# Patient Record
Sex: FEMALE
[De-identification: names, ages, dates, MRNs, and addresses within clinical notes are randomized per-mention and may not be internally consistent; named-entity substitution may affect disease eponyms.]

---

## 2017-05-10 ENCOUNTER — APPOINTMENT (OUTPATIENT)
Dept: PSYCHIATRY | Facility: CLINIC | Age: 52
End: 2017-05-10

## 2017-06-21 ENCOUNTER — APPOINTMENT (OUTPATIENT)
Dept: PSYCHIATRY | Facility: CLINIC | Age: 52
End: 2017-06-21

## 2017-11-07 ENCOUNTER — APPOINTMENT (OUTPATIENT)
Dept: PSYCHIATRY | Facility: CLINIC | Age: 52
End: 2017-11-07
Payer: COMMERCIAL

## 2017-11-07 PROCEDURE — 99214 OFFICE O/P EST MOD 30 MIN: CPT

## 2018-12-18 ENCOUNTER — APPOINTMENT (OUTPATIENT)
Dept: PSYCHIATRY | Facility: CLINIC | Age: 53
End: 2018-12-18
Payer: COMMERCIAL

## 2018-12-18 PROCEDURE — 99214 OFFICE O/P EST MOD 30 MIN: CPT

## 2019-02-01 ENCOUNTER — RX RENEWAL (OUTPATIENT)
Age: 54
End: 2019-02-01

## 2019-04-30 ENCOUNTER — APPOINTMENT (OUTPATIENT)
Dept: PSYCHIATRY | Facility: CLINIC | Age: 54
End: 2019-04-30

## 2019-06-03 ENCOUNTER — RX RENEWAL (OUTPATIENT)
Age: 54
End: 2019-06-03

## 2019-06-11 ENCOUNTER — APPOINTMENT (OUTPATIENT)
Dept: PSYCHIATRY | Facility: CLINIC | Age: 54
End: 2019-06-11
Payer: COMMERCIAL

## 2019-06-11 DIAGNOSIS — Z86.39 PERSONAL HISTORY OF OTHER ENDOCRINE, NUTRITIONAL AND METABOLIC DISEASE: ICD-10-CM

## 2019-06-11 DIAGNOSIS — E03.9 HYPOTHYROIDISM, UNSPECIFIED: ICD-10-CM

## 2019-06-11 PROCEDURE — 99214 OFFICE O/P EST MOD 30 MIN: CPT

## 2019-06-11 RX ORDER — CHROMIUM 200 MCG
1000 TABLET ORAL
Refills: 0 | Status: ACTIVE | COMMUNITY
Start: 2019-06-11

## 2019-06-11 RX ORDER — MULTIVIT WITH CALCIUM,IRON,MIN
TABLET ORAL DAILY
Qty: 90 | Refills: 0 | Status: ACTIVE | COMMUNITY
Start: 2019-06-11

## 2019-06-11 RX ORDER — THYROID, PORCINE 15 MG/1
15 TABLET ORAL DAILY
Refills: 0 | Status: ACTIVE | COMMUNITY
Start: 2019-06-11

## 2019-06-11 RX ORDER — OMEGA-3-ACID ETHYL ESTERS CAPSULES 1 G/1
1 CAPSULE, LIQUID FILLED ORAL
Refills: 0 | Status: ACTIVE | COMMUNITY
Start: 2019-06-11

## 2019-09-24 ENCOUNTER — APPOINTMENT (OUTPATIENT)
Dept: PSYCHIATRY | Facility: CLINIC | Age: 54
End: 2019-09-24

## 2019-09-26 ENCOUNTER — RX RENEWAL (OUTPATIENT)
Age: 54
End: 2019-09-26

## 2019-11-19 ENCOUNTER — APPOINTMENT (OUTPATIENT)
Dept: PSYCHIATRY | Facility: CLINIC | Age: 54
End: 2019-11-19
Payer: COMMERCIAL

## 2019-11-19 PROCEDURE — 99214 OFFICE O/P EST MOD 30 MIN: CPT

## 2020-04-21 ENCOUNTER — APPOINTMENT (OUTPATIENT)
Dept: PSYCHIATRY | Facility: CLINIC | Age: 55
End: 2020-04-21
Payer: COMMERCIAL

## 2020-04-21 PROCEDURE — 99213 OFFICE O/P EST LOW 20 MIN: CPT | Mod: 95

## 2021-02-01 ENCOUNTER — RX RENEWAL (OUTPATIENT)
Age: 56
End: 2021-02-01

## 2021-05-20 ENCOUNTER — APPOINTMENT (OUTPATIENT)
Dept: PSYCHIATRY | Facility: CLINIC | Age: 56
End: 2021-05-20
Payer: COMMERCIAL

## 2021-05-20 PROCEDURE — 99214 OFFICE O/P EST MOD 30 MIN: CPT | Mod: 95

## 2021-09-07 ENCOUNTER — RX RENEWAL (OUTPATIENT)
Age: 56
End: 2021-09-07

## 2021-11-29 ENCOUNTER — RX RENEWAL (OUTPATIENT)
Age: 56
End: 2021-11-29

## 2021-12-06 ENCOUNTER — APPOINTMENT (OUTPATIENT)
Dept: PSYCHIATRY | Facility: CLINIC | Age: 56
End: 2021-12-06
Payer: COMMERCIAL

## 2021-12-06 PROCEDURE — 99214 OFFICE O/P EST MOD 30 MIN: CPT | Mod: 95

## 2021-12-06 RX ORDER — DULOXETINE HYDROCHLORIDE 40 MG/1
40 CAPSULE, DELAYED RELEASE PELLETS ORAL DAILY
Qty: 30 | Refills: 0 | Status: DISCONTINUED | COMMUNITY
Start: 2019-01-29 | End: 2021-12-06

## 2022-01-10 RX ORDER — DULOXETINE HYDROCHLORIDE 30 MG/1
30 CAPSULE, DELAYED RELEASE PELLETS ORAL
Qty: 15 | Refills: 0 | Status: DISCONTINUED | COMMUNITY
Start: 2021-09-07 | End: 2022-01-10

## 2022-02-08 ENCOUNTER — APPOINTMENT (OUTPATIENT)
Dept: PSYCHIATRY | Facility: CLINIC | Age: 57
End: 2022-02-08
Payer: COMMERCIAL

## 2022-02-14 ENCOUNTER — RX RENEWAL (OUTPATIENT)
Age: 57
End: 2022-02-14

## 2022-03-22 ENCOUNTER — APPOINTMENT (OUTPATIENT)
Dept: PSYCHIATRY | Facility: CLINIC | Age: 57
End: 2022-03-22
Payer: COMMERCIAL

## 2022-03-22 DIAGNOSIS — F34.1 DYSTHYMIC DISORDER: ICD-10-CM

## 2022-03-22 PROCEDURE — 99213 OFFICE O/P EST LOW 20 MIN: CPT | Mod: 95

## 2022-10-27 ENCOUNTER — APPOINTMENT (OUTPATIENT)
Dept: PSYCHIATRY | Facility: CLINIC | Age: 57
End: 2022-10-27

## 2022-10-27 PROCEDURE — 99214 OFFICE O/P EST MOD 30 MIN: CPT

## 2022-10-27 RX ORDER — FLUOXETINE HYDROCHLORIDE 20 MG/1
20 CAPSULE ORAL DAILY
Qty: 30 | Refills: 0 | Status: DISCONTINUED | COMMUNITY
Start: 2022-01-10 | End: 2022-10-27

## 2022-10-27 NOTE — PHYSICAL EXAM
[None] : none [Well groomed] : well groomed [Average] : average [WNL] : within normal limits [Cooperative] : cooperative [FreeTextEntry1] : Thin appearing.  [de-identified] : restricted.

## 2022-10-27 NOTE — DISCUSSION/SUMMARY
[FreeTextEntry1] : Pt to send the undersigned her consult, which she had with heme onc/ for autoimmune w/u which pt states was negative. \par \par Pt with chronic depression, pt reports poor tolerance of higher doses of SSRI. \par Encouraged to change family of med, to SNRI.

## 2022-10-27 NOTE — HISTORY OF PRESENT ILLNESS
[FreeTextEntry1] : Pt seen and evaluated today, states she had gone up to 40 mg of Prozac but felt more anxious. \par Pt report since starting her job , she may have been more anxious, she is glad to be working, but still finds life "blah". \par The office is isolative, post COVID, and she has not been exercising due to having frozen shoulder and finds her mood down "in the dumps", generally she finds herself irritable. \par Pt states she is worse on the w/e. Pt had gotten her son tested by neuropsychology- takes 4-6 hours to do homework. \par Pt has a new dog with a stomach ailment and has been incontinent. Pt reports lack of mood reactivity, she has tickets to a [employer] play and plans to see her sister in [place]. \par

## 2023-01-12 ENCOUNTER — NON-APPOINTMENT (OUTPATIENT)
Age: 58
End: 2023-01-12

## 2023-01-12 RX ORDER — DESVENLAFAXINE 25 MG/1
25 TABLET, EXTENDED RELEASE ORAL
Qty: 90 | Refills: 3 | Status: DISCONTINUED | COMMUNITY
Start: 2022-10-27 | End: 2023-01-12

## 2023-01-12 NOTE — DISCUSSION/SUMMARY
[FreeTextEntry1] : Pt reports that she is diagnosed with skin cancer and is "freaking out", pt reports hot flashes on Pristiq and is not willing to increase the dose to 50 mg. Pt wants to return to Duloxetine, pt has 20 mg tablets at home, and also 30 mg tablets, was advised to start with 20 mg and then titrate to 30 mg and monitor as well as come in for a scheduled appointment, pt reports she is catastrophizing. \par Plan to give limited amount of Alprazolam, pt to speak with surgeon about use of med prior to Mohs procedure.

## 2023-03-06 ENCOUNTER — APPOINTMENT (OUTPATIENT)
Dept: PSYCHIATRY | Facility: CLINIC | Age: 58
End: 2023-03-06

## 2023-03-19 ENCOUNTER — RX RENEWAL (OUTPATIENT)
Age: 58
End: 2023-03-19

## 2023-03-20 ENCOUNTER — APPOINTMENT (OUTPATIENT)
Dept: PSYCHIATRY | Facility: CLINIC | Age: 58
End: 2023-03-20
Payer: COMMERCIAL

## 2023-03-20 PROCEDURE — 99214 OFFICE O/P EST MOD 30 MIN: CPT | Mod: 95

## 2023-03-20 NOTE — HISTORY OF PRESENT ILLNESS
[FreeTextEntry1] : Please see Zung rating scale in October of 2022. Per pt the target sx of irritability and feeling down hearted have improved. \par Pt reports that she has skin cancer - has surgery pending on her nose. She has scans pending. \par Pt has basal cell cancers removed. \par Pt now less nervous. \par Pt did take Alprazolam prn for the first surgery, which had helped, and anxiety is less with this pending surgery. \par No change in neurovegetative sx, no tyson, no psychosis.

## 2023-03-20 NOTE — PLAN
[Yes. details: ___] : Yes, [unfilled] [FreeTextEntry4] : Pt is meeting her goals. \par Pt tolerating medications.  [FreeTextEntry5] : Pt reports she is clinically stable, generally seen twice or three times per year.

## 2023-03-20 NOTE — REVIEW OF SYSTEMS
[Negative] : Allergic/Immunologic [FreeTextEntry9] : pt plays pickle ball, pulled her groin.  [de-identified] : see interval history.

## 2023-03-20 NOTE — PHYSICAL EXAM
[Well groomed] : well groomed [Cooperative] : cooperative [Euthymic] : euthymic [Full] : full [Clear] : clear [Linear/Goal Directed] : linear/goal directed [None] : none [None Reported] : none reported [Average] : average [WNL] : within normal limits Statement Selected

## 2023-03-20 NOTE — REASON FOR VISIT
[Continuing, patient seen in-person within last 12 months] : Telehealth services are continuing as patient has been seen in-person within last 12 months. [Telehealth (audio & video) - Individual/Group] : This visit was provided via telehealth using real-time 2-way audio visual technology. [Medical Office: ([facility])___] : The provider was located at the medical office in [unfilled]. [Home] : The patient, [unfilled], was located at home, [unfilled], at the time of the visit. [Verbal consent obtained from patient/other participant(s)] : Verbal consent for telehealth/telephonic services obtained from patient/other participant(s) [Patient] : Patient [FreeTextEntry4] : 4:30 pm  [FreeTextEntry2] : 10/27/2022 [FreeTextEntry1] : Pt is a follow up visit.

## 2023-03-20 NOTE — RISK ASSESSMENT
[No, patient denies ideation or behavior] : No, patient denies ideation or behavior [Low acute suicide risk] : Low acute suicide risk [Yes] : Yes

## 2023-10-04 ENCOUNTER — APPOINTMENT (OUTPATIENT)
Dept: PSYCHIATRY | Facility: CLINIC | Age: 58
End: 2023-10-04
Payer: COMMERCIAL

## 2023-10-04 PROCEDURE — 99213 OFFICE O/P EST LOW 20 MIN: CPT | Mod: 95

## 2023-10-04 RX ORDER — DULOXETINE HYDROCHLORIDE 20 MG/1
20 CAPSULE, DELAYED RELEASE PELLETS ORAL DAILY
Qty: 7 | Refills: 0 | Status: DISCONTINUED | COMMUNITY
Start: 2023-01-12 | End: 2023-10-04

## 2024-01-18 ENCOUNTER — RX ONLY (RX ONLY)
Age: 59
End: 2024-01-18

## 2024-01-18 ENCOUNTER — OFFICE (OUTPATIENT)
Dept: URBAN - METROPOLITAN AREA CLINIC 109 | Facility: CLINIC | Age: 59
Setting detail: OPHTHALMOLOGY
End: 2024-01-18
Payer: COMMERCIAL

## 2024-01-18 DIAGNOSIS — H16.223: ICD-10-CM

## 2024-01-18 DIAGNOSIS — H02.834: ICD-10-CM

## 2024-01-18 DIAGNOSIS — H02.015: ICD-10-CM

## 2024-01-18 DIAGNOSIS — H02.835: ICD-10-CM

## 2024-01-18 DIAGNOSIS — H00.025: ICD-10-CM

## 2024-01-18 DIAGNOSIS — H02.832: ICD-10-CM

## 2024-01-18 DIAGNOSIS — H02.831: ICD-10-CM

## 2024-01-18 PROCEDURE — 99203 OFFICE O/P NEW LOW 30 MIN: CPT | Performed by: OPHTHALMOLOGY

## 2024-01-18 ASSESSMENT — REFRACTION_CURRENTRX
OS_SPHERE: -2.50
OD_SPHERE: -2.25
OS_ADD: +2.25
OD_CYLINDER: +0.25
OD_OVR_VA: 20/
OS_VPRISM_DIRECTION: PROGS
OD_AXIS: 45
OS_OVR_VA: 20/
OS_CYLINDER: +0.50
OD_VPRISM_DIRECTION: PROGS
OS_AXIS: 142
OD_ADD: +2.25

## 2024-01-18 ASSESSMENT — REFRACTION_AUTOREFRACTION
OS_SPHERE: -0.75
OD_AXIS: 122
OS_AXIS: 069
OS_CYLINDER: -0.50
OD_SPHERE: -1.00
OD_CYLINDER: -0.50

## 2024-01-18 ASSESSMENT — LID EXAM ASSESSMENTS
OS_TRICHIASIS: LLL T
OS_DERMATOCHALASIS: 1+

## 2024-01-18 ASSESSMENT — CONFRONTATIONAL VISUAL FIELD TEST (CVF)
OS_FINDINGS: FULL
OD_FINDINGS: FULL

## 2024-01-18 ASSESSMENT — SPHEQUIV_DERIVED
OD_SPHEQUIV: -1.25
OS_SPHEQUIV: -1

## 2024-02-15 ENCOUNTER — APPOINTMENT (OUTPATIENT)
Dept: GASTROENTEROLOGY | Facility: CLINIC | Age: 59
End: 2024-02-15
Payer: COMMERCIAL

## 2024-02-15 VITALS
OXYGEN SATURATION: 99 % | WEIGHT: 110 LBS | TEMPERATURE: 96.8 F | HEIGHT: 64 IN | BODY MASS INDEX: 18.78 KG/M2 | SYSTOLIC BLOOD PRESSURE: 108 MMHG | HEART RATE: 71 BPM | DIASTOLIC BLOOD PRESSURE: 66 MMHG

## 2024-02-15 DIAGNOSIS — K59.00 CONSTIPATION, UNSPECIFIED: ICD-10-CM

## 2024-02-15 PROCEDURE — 99203 OFFICE O/P NEW LOW 30 MIN: CPT

## 2024-02-15 RX ORDER — POLYETHYLENE GLYCOL-3350 AND ELECTROLYTES 236; 6.74; 5.86; 2.97; 22.74 G/274.31G; G/274.31G; G/274.31G; G/274.31G; G/274.31G
236 POWDER, FOR SOLUTION ORAL
Qty: 1 | Refills: 0 | Status: ACTIVE | COMMUNITY
Start: 2024-02-15 | End: 1900-01-01

## 2024-02-15 NOTE — HISTORY OF PRESENT ILLNESS
[FreeTextEntry1] : Patient came to the office today to arrange for colonoscopy.  It has been more than 10 years since her last examination.  The visit was prompted by patient's recent complaint of constipation where she is passing hard pellet-like stools.  She had some small blood while straining.  She denies associated nausea vomiting fever chills melena diarrhea weight loss or other systemic complaints.

## 2024-02-15 NOTE — PHYSICAL EXAM

## 2024-02-15 NOTE — ASSESSMENT
[FreeTextEntry1] : Impression and plan Patient came to the office today to discuss colonoscopy.  It has been more than 10 years since her last exam.  She is chronically constipated and we had a discussion regarding management.  The patient will increase fiber and fluid in her diet she will use over-the-counter laxatives i.e. MiraLAX if necessary.  In the interim patient will continue to observe for any new bleeding and contact me as necessary.  The risks benefits alternatives and limitation of colonoscopy were discussed.

## 2024-04-23 ENCOUNTER — APPOINTMENT (OUTPATIENT)
Dept: GASTROENTEROLOGY | Facility: AMBULATORY MEDICAL SERVICES | Age: 59
End: 2024-04-23
Payer: COMMERCIAL

## 2024-04-23 PROCEDURE — 45378 DIAGNOSTIC COLONOSCOPY: CPT

## 2024-06-21 ENCOUNTER — APPOINTMENT (OUTPATIENT)
Dept: PSYCHIATRY | Facility: CLINIC | Age: 59
End: 2024-06-21
Payer: COMMERCIAL

## 2024-06-21 ENCOUNTER — APPOINTMENT (OUTPATIENT)
Dept: PSYCHIATRY | Facility: CLINIC | Age: 59
End: 2024-06-21

## 2024-06-21 DIAGNOSIS — F34.1 DYSTHYMIC DISORDER: ICD-10-CM

## 2024-06-21 DIAGNOSIS — F41.1 GENERALIZED ANXIETY DISORDER: ICD-10-CM

## 2024-06-21 PROCEDURE — 99214 OFFICE O/P EST MOD 30 MIN: CPT

## 2024-06-21 RX ORDER — ALPRAZOLAM 0.25 MG/1
0.25 TABLET ORAL DAILY
Qty: 5 | Refills: 0 | Status: ACTIVE | COMMUNITY
Start: 2023-01-12 | End: 1900-01-01

## 2024-06-21 RX ORDER — VORTIOXETINE 10 MG/1
10 TABLET, FILM COATED ORAL DAILY
Qty: 30 | Refills: 1 | Status: ACTIVE | COMMUNITY
Start: 2024-06-21 | End: 1900-01-01

## 2024-06-21 RX ORDER — DULOXETINE HYDROCHLORIDE 30 MG/1
30 CAPSULE, DELAYED RELEASE PELLETS ORAL
Qty: 90 | Refills: 3 | Status: DISCONTINUED | COMMUNITY
Start: 2023-01-13 | End: 2024-06-21

## 2024-06-21 NOTE — PHYSICAL EXAM
[Cooperative] : cooperative [Clear] : clear [Linear/Goal Directed] : linear/goal directed [Average] : average [WNL] : within normal limits [Well groomed] : well groomed [Depressed] : depressed [Anxious] : anxious [None] : none [None Reported] : none reported [FreeTextEntry1] : Very polite.  [de-identified] : mild restriction to depressed and anxious range.

## 2024-06-21 NOTE — PLAN
[No] : No [Medication education provided] : Medication education provided. [Rationale for medication choices, possible risks/precautions, benefits, alternative treatment choices, and consequences of non-treatment discussed] : Rationale for medication choices, possible risks/precautions, benefits, alternative treatment choices, and consequences of non-treatment discussed with patient/family/caregiver  [FreeTextEntry5] : I stop checked no entries.  This report was requested by: Eve Lucio | Reference #: [id number] Pt offered psychiatric NP in consultation if she runs into issues with stopping Cymbalta and starting Trintellix.  Gene sight testing offered.  [FreeTextEntry4] : Meeting goals of care but has had chronic dysthymia resurface, is not a candidate for an increase in dosage.

## 2024-06-21 NOTE — HISTORY OF PRESENT ILLNESS
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 Pt at home being irritable manifests as cutting remarks and being short tempered. Pt finds herself overreacting to slights she would typically let go. Pt reports she is more anxious, in terms of ruminating thoughts.  Psychoeducation provided regarding her treatment options such as Trintellix, TMS, psychotherapy, and mood stabilizers as adjuncts.   [FreeTextEntry2] : Pt has been on Celexa, is allergic to Wellbutrin, has done Pristiq, did not tolerate Cymbalta 40 mg as she felt "blah". Pt very concerned about feeling drowsy, and so wants to avoid sedating medications has not had a TCA trial.  Pt reports "brain zaps" if she forgets to take medication, she has also been on Paxil. Sertraline (incomplete trial).

## 2024-09-20 NOTE — DISCUSSION/SUMMARY
[FreeTextEntry1] : Pt reports she tried the Trintellix but had a side effect that made her feel "uncomfortable." Pt wants to return to Duloxetine, in review of notes, the 40 mg dose was not helpful.  Pt agreed to continue 30 mg for 90 day and then return to the undersigned for follow up.  Plan: Stop Trintellix, start Duloxetine 30 mg.

## 2025-01-21 ENCOUNTER — APPOINTMENT (OUTPATIENT)
Dept: PSYCHIATRY | Facility: CLINIC | Age: 60
End: 2025-01-21
Payer: COMMERCIAL

## 2025-01-21 DIAGNOSIS — F34.1 DYSTHYMIC DISORDER: ICD-10-CM

## 2025-01-21 DIAGNOSIS — F41.1 GENERALIZED ANXIETY DISORDER: ICD-10-CM

## 2025-01-21 PROCEDURE — 99214 OFFICE O/P EST MOD 30 MIN: CPT | Mod: 95
